# Patient Record
Sex: FEMALE | Race: WHITE | Employment: UNEMPLOYED | URBAN - METROPOLITAN AREA
[De-identification: names, ages, dates, MRNs, and addresses within clinical notes are randomized per-mention and may not be internally consistent; named-entity substitution may affect disease eponyms.]

---
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
## 2024-12-17 NOTE — TELEPHONE ENCOUNTER
Reason for call:   [x] Refill   [] Prior Auth  [] Other:     Office:   [x] PCP/Provider -   [] Specialty/Provider -     Medication:   Lunesta 3 mg- take 1 tablet by mouth daily at bedtime as needed      Pharmacy: [facility]    Does the patient have enough for 3 days?   [] Yes   [x] No - Send as HP to POD     Tc with grandmother, who stated pt continues to have sore throat. Strep negative yesterday. Pt is coughing, stuffed up, runny nose. Poor sleep d/t coughing. Drinking smoothies and water. Is taking Dayquil, Nyquil and trazodone. Advised to review ingredients of Dayquil before taking acetaminophen. Encouraged pt to rest, drink warm fluids, use throat lozenges, steam can soothe cough. Grandmother concerned of pt's past hx of strep needing hospitalization.    Please advise on sxs and if should be seen for f/u.    Additional Information    Negative: Severe difficulty breathing (struggling for each breath, making grunting noises with each breath, unable to speak or cry because of difficulty breathing, severe retractions)    Negative: Bluish (or gray) lips or face now    Negative: Sounds like a life-threatening emergency to the triager    Negative: Croup is main symptom (Reason: a throat culture is probably not needed)    Negative: Cough is main symptom (Reason: a throat culture is probably not needed)    Negative: Runny nose is the main symptom  (Reason: a throat culture is probably not needed)    Negative: Age < 2 years and fluid intake is decreased    Negative: Can't move neck normally and fever    Negative: Drooling or spitting out saliva (because can't swallow)    Negative: Fever and weak immune system (sickle cell disease, HIV, chemotherapy, organ transplant, chronic steroids, etc)    Negative: Difficulty breathing (per caller), but not severe    Negative: Child sounds very sick or weak to the triager    Negative: Can't move neck normally but no fever    Negative: Complains that can't open mouth normally (without being asked)    Negative: Fever > 105 F (40.6 C)    Negative: Dehydration suspected (very dry mouth, no tears with crying and no urine for > 12 hours)    Negative: Sore throat pain is SEVERE and not improved after 2 hours of pain medicine    Negative: Age < 2 years old    Negative: Rash that's widespread     "Negative: Cloudy discharge from ear canal    Negative: Fever present > 3 days    Negative: Fever returns after going away > 24 hours and symptoms worse or not improved    Negative: Sore throat with fever is the main symptom and present > 48 hours    Negative: Big lymph nodes in neck and new-onset    Negative: Earache    Negative: Sinus pain (not just congestion ) persists > 48 hours after using nasal washes (Age: usually 6 years or older)    Negative: Sores on the skin    Negative: Parent wants an antibiotic    Negative: Recent strep exposure and sore throat    Negative: Sore throat (without fever) is the only symptom and persists > 48 hours    Negative: Sore throat with cough/cold symptoms present > 5 days    Negative: Parent requests strep test only visit (Note: Strep tests aren't urgent. Treating a strep infection within 7 days of onset will prevent rheumatic fever.)    Answer Assessment - Initial Assessment Questions  1. ONSET: \"When did the throat start hurting?\" (Hours or days ago)         2. SEVERITY: \"How bad is the sore throat?\"      - MILD: doesn't interfere with eating or normal activities     - MODERATE: interferes with eating some solids and normal activities     - SEVERE PAIN: excruciating pain, interferes with most normal activities     - SEVERE DYSPHAGIA: can't swallow liquids, drooling      moderate  3. STREP EXPOSURE: \"Has there been any exposure to strep within the past week?\" If so, ask: \"What type of contact occurred?\"       No; negative strep yesterday  4. VIRAL SYMPTOMS: \"Are there any symptoms of a cold, such as a runny nose, cough, hoarse voice/cry or red eyes?\"       cough  5. FEVER: \"Does your child have a fever?\" If so, ask: \"What is it?\", \"How was it measured?\" and \"When did it start?\"       no  6. PUS ON THE TONSILS: Only ask about this if the caller has already told you that they've looked at the throat.       no  7. CHILD'S APPEARANCE: \"How sick is your child acting?\" \" What is he " "doing right now?\" If asleep, ask: \"How was he acting before he went to sleep?\"      fatigued    Protocols used: SORE THROAT-P-OH    "

## 2025-03-17 ENCOUNTER — TELEPHONE (OUTPATIENT)
Dept: FAMILY MEDICINE | Facility: CLINIC | Age: 20
End: 2025-03-17
Payer: COMMERCIAL

## 2025-03-17 NOTE — TELEPHONE ENCOUNTER
Patient calling to confirm Nexplanon and when due for replacement.    Placed: 9/20/22, due for replacement 3  years.    Patient will call back to schedule visit.

## 2025-09-04 ENCOUNTER — OFFICE VISIT (OUTPATIENT)
Dept: FAMILY MEDICINE | Facility: CLINIC | Age: 20
End: 2025-09-04
Payer: MEDICAID

## 2025-09-04 VITALS
OXYGEN SATURATION: 95 % | WEIGHT: 293 LBS | TEMPERATURE: 97.7 F | DIASTOLIC BLOOD PRESSURE: 80 MMHG | HEIGHT: 66 IN | SYSTOLIC BLOOD PRESSURE: 128 MMHG | RESPIRATION RATE: 16 BRPM | BODY MASS INDEX: 47.09 KG/M2 | HEART RATE: 101 BPM

## 2025-09-04 DIAGNOSIS — Z30.46 NEXPLANON REMOVAL: Primary | ICD-10-CM
